# Patient Record
Sex: FEMALE | Race: WHITE | NOT HISPANIC OR LATINO | URBAN - METROPOLITAN AREA
[De-identification: names, ages, dates, MRNs, and addresses within clinical notes are randomized per-mention and may not be internally consistent; named-entity substitution may affect disease eponyms.]

---

## 2024-04-26 ENCOUNTER — APPOINTMENT (RX ONLY)
Dept: URBAN - METROPOLITAN AREA CLINIC 354 | Facility: CLINIC | Age: 24
Setting detail: DERMATOLOGY
End: 2024-04-26

## 2024-04-26 DIAGNOSIS — L738 OTHER SPECIFIED DISEASES OF HAIR AND HAIR FOLLICLES: ICD-10-CM | Status: INADEQUATELY CONTROLLED

## 2024-04-26 DIAGNOSIS — L663 OTHER SPECIFIED DISEASES OF HAIR AND HAIR FOLLICLES: ICD-10-CM | Status: INADEQUATELY CONTROLLED

## 2024-04-26 DIAGNOSIS — L73.9 FOLLICULAR DISORDER, UNSPECIFIED: ICD-10-CM | Status: INADEQUATELY CONTROLLED

## 2024-04-26 PROBLEM — L02.221 FURUNCLE OF ABDOMINAL WALL: Status: ACTIVE | Noted: 2024-04-26

## 2024-04-26 PROCEDURE — ? DEFER

## 2024-04-26 PROCEDURE — ? COUNSELING

## 2024-04-26 PROCEDURE — 99202 OFFICE O/P NEW SF 15 MIN: CPT

## 2024-04-26 PROCEDURE — ? ADDITIONAL NOTES

## 2024-04-26 ASSESSMENT — LOCATION DETAILED DESCRIPTION DERM: LOCATION DETAILED: RIGHT MONS PUBIS

## 2024-04-26 ASSESSMENT — LOCATION ZONE DERM: LOCATION ZONE: VULVA

## 2024-04-26 ASSESSMENT — LOCATION SIMPLE DESCRIPTION DERM: LOCATION SIMPLE: MONS PUBIS

## 2024-04-26 NOTE — HPI: SKIN LESION
What Type Of Note Output Would You Prefer (Optional)?: Bullet Format
How Severe Is Your Skin Lesion?: mild
[id number]: treated_been_treated
Is This A New Presentation, Or A Follow-Up?: Skin Lesion
Additional History: Patient denies similar lesions elsewhere.  Patient shaves area

## 2024-04-26 NOTE — PROCEDURE: DEFER
Introduction Text (Please End With A Colon): The following procedure was deferred:
X Size Of Lesion In Cm (Optional): 0
Procedure To Be Performed At Next Visit: Laser Hair Removal
Detail Level: Simple

## 2024-04-26 NOTE — PROCEDURE: ADDITIONAL NOTES
Additional Notes: Seen by GYN who has prescribed tried 2 different oral antibiotics and two different topical ointments. GYN favored possible HS.\\n\\nDDX reviewed.  I believe these changes are result of  ingrown hairs due to shaving.   Today quiet with post inflammatory erythema noted. Discussed having LHR as possible better solution to shavind/waxing.  I favor ingrown hairs over follicular HS and hopeful LHR will be of benefit.
Detail Level: Zone
Render Risk Assessment In Note?: no

## 2024-04-26 NOTE — PROCEDURE: COUNSELING
Detail Level: Detailed
Patient Specific Counseling (Will Not Stick From Patient To Patient): Condition improving as hair have grown out.  Patient has failed trial of topical and oral antibiotics.  LHR with risks and benefits reviewed